# Patient Record
Sex: MALE | Race: WHITE | NOT HISPANIC OR LATINO | Employment: FULL TIME | URBAN - METROPOLITAN AREA
[De-identification: names, ages, dates, MRNs, and addresses within clinical notes are randomized per-mention and may not be internally consistent; named-entity substitution may affect disease eponyms.]

---

## 2017-10-03 ENCOUNTER — GENERIC CONVERSION - ENCOUNTER (OUTPATIENT)
Dept: OTHER | Facility: OTHER | Age: 22
End: 2017-10-03

## 2017-10-03 DIAGNOSIS — R11.2 NAUSEA WITH VOMITING: ICD-10-CM

## 2017-10-03 DIAGNOSIS — Z13.220 ENCOUNTER FOR SCREENING FOR LIPOID DISORDERS: ICD-10-CM

## 2017-10-03 DIAGNOSIS — R10.9 ABDOMINAL PAIN: ICD-10-CM

## 2017-10-14 ENCOUNTER — HOSPITAL ENCOUNTER (OUTPATIENT)
Dept: ULTRASOUND IMAGING | Facility: HOSPITAL | Age: 22
Discharge: HOME | End: 2017-10-14
Payer: COMMERCIAL

## 2017-10-14 DIAGNOSIS — R11.2 NAUSEA WITH VOMITING: ICD-10-CM

## 2017-10-14 DIAGNOSIS — R10.9 ABDOMINAL PAIN: ICD-10-CM

## 2017-10-14 PROCEDURE — 76705 ECHO EXAM OF ABDOMEN: CPT

## 2017-10-17 ENCOUNTER — GENERIC CONVERSION - ENCOUNTER (OUTPATIENT)
Dept: OTHER | Facility: OTHER | Age: 22
End: 2017-10-17

## 2017-10-19 ENCOUNTER — APPOINTMENT (OUTPATIENT)
Dept: LAB | Facility: MEDICAL CENTER | Age: 22
End: 2017-10-19
Payer: COMMERCIAL

## 2017-10-19 ENCOUNTER — TRANSCRIBE ORDERS (OUTPATIENT)
Dept: ADMINISTRATIVE | Facility: HOSPITAL | Age: 22
End: 2017-10-19

## 2017-10-19 DIAGNOSIS — Z13.220 ENCOUNTER FOR SCREENING FOR LIPOID DISORDERS: ICD-10-CM

## 2017-10-19 DIAGNOSIS — R11.2 NAUSEA WITH VOMITING: ICD-10-CM

## 2017-10-19 DIAGNOSIS — R10.9 ABDOMINAL PAIN: ICD-10-CM

## 2017-10-19 LAB
ALBUMIN SERPL BCP-MCNC: 4.1 G/DL (ref 3.5–5)
ALP SERPL-CCNC: 93 U/L (ref 46–116)
ALT SERPL W P-5'-P-CCNC: 21 U/L (ref 12–78)
ANION GAP SERPL CALCULATED.3IONS-SCNC: 7 MMOL/L (ref 4–13)
AST SERPL W P-5'-P-CCNC: 14 U/L (ref 5–45)
BACTERIA UR QL AUTO: NORMAL /HPF
BASOPHILS # BLD AUTO: 0.01 THOUSANDS/ΜL (ref 0–0.1)
BASOPHILS NFR BLD AUTO: 0 % (ref 0–1)
BILIRUB SERPL-MCNC: 0.49 MG/DL (ref 0.2–1)
BILIRUB UR QL STRIP: NEGATIVE
BUN SERPL-MCNC: 9 MG/DL (ref 5–25)
CALCIUM SERPL-MCNC: 9.4 MG/DL (ref 8.3–10.1)
CHLORIDE SERPL-SCNC: 105 MMOL/L (ref 100–108)
CHOLEST SERPL-MCNC: 101 MG/DL (ref 50–200)
CLARITY UR: ABNORMAL
CO2 SERPL-SCNC: 28 MMOL/L (ref 21–32)
COLOR UR: YELLOW
CREAT SERPL-MCNC: 0.82 MG/DL (ref 0.6–1.3)
CRP SERPL QL: 6.4 MG/L
EOSINOPHIL # BLD AUTO: 0.04 THOUSAND/ΜL (ref 0–0.61)
EOSINOPHIL NFR BLD AUTO: 1 % (ref 0–6)
ERYTHROCYTE [DISTWIDTH] IN BLOOD BY AUTOMATED COUNT: 12.5 % (ref 11.6–15.1)
ERYTHROCYTE [SEDIMENTATION RATE] IN BLOOD: 5 MM/HOUR (ref 0–10)
GFR SERPL CREATININE-BSD FRML MDRD: 124 ML/MIN/1.73SQ M
GLUCOSE P FAST SERPL-MCNC: 88 MG/DL (ref 65–99)
GLUCOSE UR STRIP-MCNC: NEGATIVE MG/DL
HCT VFR BLD AUTO: 45.2 % (ref 36.5–49.3)
HDLC SERPL-MCNC: 36 MG/DL (ref 40–60)
HGB BLD-MCNC: 15.7 G/DL (ref 12–17)
HGB UR QL STRIP.AUTO: NEGATIVE
HYALINE CASTS #/AREA URNS LPF: NORMAL /LPF
KETONES UR STRIP-MCNC: NEGATIVE MG/DL
LDLC SERPL CALC-MCNC: 54 MG/DL (ref 0–100)
LEUKOCYTE ESTERASE UR QL STRIP: NEGATIVE
LIPASE SERPL-CCNC: 120 U/L (ref 73–393)
LYMPHOCYTES # BLD AUTO: 1.67 THOUSANDS/ΜL (ref 0.6–4.47)
LYMPHOCYTES NFR BLD AUTO: 30 % (ref 14–44)
MCH RBC QN AUTO: 31 PG (ref 26.8–34.3)
MCHC RBC AUTO-ENTMCNC: 34.7 G/DL (ref 31.4–37.4)
MCV RBC AUTO: 89 FL (ref 82–98)
MONOCYTES # BLD AUTO: 0.86 THOUSAND/ΜL (ref 0.17–1.22)
MONOCYTES NFR BLD AUTO: 16 % (ref 4–12)
NEUTROPHILS # BLD AUTO: 2.91 THOUSANDS/ΜL (ref 1.85–7.62)
NEUTS SEG NFR BLD AUTO: 53 % (ref 43–75)
NITRITE UR QL STRIP: NEGATIVE
NON-SQ EPI CELLS URNS QL MICRO: NORMAL /HPF
NRBC BLD AUTO-RTO: 0 /100 WBCS
PH UR STRIP.AUTO: 8 [PH] (ref 4.5–8)
PLATELET # BLD AUTO: 202 THOUSANDS/UL (ref 149–390)
PMV BLD AUTO: 10.8 FL (ref 8.9–12.7)
POTASSIUM SERPL-SCNC: 4.3 MMOL/L (ref 3.5–5.3)
PROT SERPL-MCNC: 7.7 G/DL (ref 6.4–8.2)
PROT UR STRIP-MCNC: ABNORMAL MG/DL
RBC # BLD AUTO: 5.06 MILLION/UL (ref 3.88–5.62)
RBC #/AREA URNS AUTO: NORMAL /HPF
SODIUM SERPL-SCNC: 140 MMOL/L (ref 136–145)
SP GR UR STRIP.AUTO: 1.02 (ref 1–1.03)
TRIGL SERPL-MCNC: 57 MG/DL
TSH SERPL DL<=0.05 MIU/L-ACNC: 2.2 UIU/ML (ref 0.36–3.74)
UROBILINOGEN UR QL STRIP.AUTO: 0.2 E.U./DL
WBC # BLD AUTO: 5.53 THOUSAND/UL (ref 4.31–10.16)
WBC #/AREA URNS AUTO: NORMAL /HPF

## 2017-10-19 PROCEDURE — 85025 COMPLETE CBC W/AUTO DIFF WBC: CPT

## 2017-10-19 PROCEDURE — 83690 ASSAY OF LIPASE: CPT

## 2017-10-19 PROCEDURE — 36415 COLL VENOUS BLD VENIPUNCTURE: CPT

## 2017-10-19 PROCEDURE — 80061 LIPID PANEL: CPT

## 2017-10-19 PROCEDURE — 80053 COMPREHEN METABOLIC PANEL: CPT

## 2017-10-19 PROCEDURE — 86140 C-REACTIVE PROTEIN: CPT

## 2017-10-19 PROCEDURE — 85652 RBC SED RATE AUTOMATED: CPT

## 2017-10-19 PROCEDURE — 81001 URINALYSIS AUTO W/SCOPE: CPT

## 2017-10-19 PROCEDURE — 84443 ASSAY THYROID STIM HORMONE: CPT

## 2017-10-20 ENCOUNTER — GENERIC CONVERSION - ENCOUNTER (OUTPATIENT)
Dept: OTHER | Facility: OTHER | Age: 22
End: 2017-10-20

## 2017-11-08 ENCOUNTER — GENERIC CONVERSION - ENCOUNTER (OUTPATIENT)
Dept: OTHER | Facility: OTHER | Age: 22
End: 2017-11-08

## 2018-01-12 NOTE — RESULT NOTES
Verified Results  [address] [place] [address] [id number] 09:20AM Author Savannah Order Number: [id number]   Performing Comments: RUQ US, rule out cholelithiasis   - Patient Instructions: To schedule this appointment, please contact Central Scheduling at [phone]  Test Name Result Flag Reference   US ABDOMEN LIMITED (Report)     RIGHT UPPER QUADRANT ULTRASOUND     INDICATION: R10 9: Unspecified abdominal pain   R11 2: Nausea with vomiting, unspecified  History taken directly from the electronic ordering system  COMPARISON: None  TECHNIQUE:  Real-time ultrasound of the right upper quadrant was performed with a curvilinear transducer with both volumetric sweeps and still imaging techniques  FINDINGS:     PANCREAS: Visualized portions of the pancreas are within normal limits  AORTA AND IVC: Visualized portions are normal for patient age  LIVER:   Size: Within normal range  The liver measures 15 2 cm in the midclavicular line  Contour: Surface contour is smooth  Parenchyma: Echogenicity and echotexture are within normal limits  No evidence of suspicious mass  Limited imaging of the main portal vein shows it to be patent and hepatopetal       BILIARY:   The gallbladder is normal in caliber  No wall thickening or pericholecystic fluid  No stones or sludge identified  No sonographic Darnell's sign  No intrahepatic biliary dilatation  CBD measures 2 mm  No choledocholithiasis  KIDNEY:    Right kidney measures 11 3 x 3 7 cm  Within normal limits  ASCITES:  None         IMPRESSION:     Normal right upper quadrant abdomen sonogram        Workstation performed: [id number]     Signed by:   Salvador Flowers MD   10/17/17       Signatures   Electronically signed by : MIRACLE Calix ; Oct 17 2017  3:45PM EST                       (Author)

## 2018-01-22 VITALS
BODY MASS INDEX: 18.02 KG/M2 | DIASTOLIC BLOOD PRESSURE: 64 MMHG | WEIGHT: 136 LBS | TEMPERATURE: 98 F | SYSTOLIC BLOOD PRESSURE: 118 MMHG | HEIGHT: 73 IN

## 2018-01-22 VITALS
BODY MASS INDEX: 18.16 KG/M2 | DIASTOLIC BLOOD PRESSURE: 74 MMHG | HEIGHT: 73 IN | SYSTOLIC BLOOD PRESSURE: 116 MMHG | WEIGHT: 137 LBS

## 2018-01-22 VITALS
DIASTOLIC BLOOD PRESSURE: 64 MMHG | SYSTOLIC BLOOD PRESSURE: 110 MMHG | HEIGHT: 73 IN | WEIGHT: 137 LBS | BODY MASS INDEX: 18.16 KG/M2 | TEMPERATURE: 97 F

## 2018-10-16 ENCOUNTER — OFFICE VISIT (OUTPATIENT)
Dept: FAMILY MEDICINE CLINIC | Facility: CLINIC | Age: 23
End: 2018-10-16
Payer: COMMERCIAL

## 2018-10-16 ENCOUNTER — TELEPHONE (OUTPATIENT)
Dept: PSYCHIATRY | Facility: CLINIC | Age: 23
End: 2018-10-16

## 2018-10-16 VITALS
HEART RATE: 92 BPM | SYSTOLIC BLOOD PRESSURE: 118 MMHG | HEIGHT: 72 IN | TEMPERATURE: 98.6 F | DIASTOLIC BLOOD PRESSURE: 78 MMHG | BODY MASS INDEX: 17.64 KG/M2 | OXYGEN SATURATION: 98 % | WEIGHT: 130.2 LBS

## 2018-10-16 DIAGNOSIS — Z72.0 TOBACCO ABUSE: Primary | ICD-10-CM

## 2018-10-16 DIAGNOSIS — R10.9 ABDOMINAL PAIN OF MULTIPLE SITES: ICD-10-CM

## 2018-10-16 DIAGNOSIS — F41.9 ANXIETY: ICD-10-CM

## 2018-10-16 PROBLEM — R11.2 NAUSEA AND VOMITING: Status: ACTIVE | Noted: 2017-10-03

## 2018-10-16 PROCEDURE — 99214 OFFICE O/P EST MOD 30 MIN: CPT | Performed by: FAMILY MEDICINE

## 2018-10-16 RX ORDER — OMEPRAZOLE 20 MG/1
20 CAPSULE, DELAYED RELEASE ORAL 2 TIMES DAILY
COMMUNITY

## 2018-10-16 RX ORDER — VARENICLINE TARTRATE 25 MG
KIT ORAL
Qty: 53 TABLET | Refills: 0 | Status: SHIPPED | OUTPATIENT
Start: 2018-10-16 | End: 2021-03-16

## 2018-10-16 NOTE — TELEPHONE ENCOUNTER
Behavorial Health Outpatient Intake Questions    Referred by: DR IGLESIAS    Check with provider before scheduling    Are there any developmental disabilities? No    Does the patient have hearing impairment? No    Does the patient have ICM or CTT? No    Taking injectable psychiatric medications? NoIf yes, patient can not be seen here  Has the patient ever seen or currently see a psychiatrist? No If yes who/when? Has the patient ever seen or currently see a therapist? Yes If yes who/when? SEEN 7 YRS AGO,IN [place] ,FOR DEPRESSION    How many visits did the pt have for previous psychiatric treatment?  History    Has the patient served in the [place] [address]? No    If yes, have you had combat services? No    Was the patient activated into federal active duty as a member of the national guard or reserve? No    Minor Child    Who has custody of the child? Is there a custody agreement? If there is a custody agreement remind parent that they must bring a copy to the first appt or they will not be seen  [facility]     Presenting Problem (in patient's words) ANXIETY, HX: DEPRESSION    Substance Abuse:No concerns of substance abuse are reported  Has the patient been seen here previously, either inpatient or outpatient? No outpatient    If seen as outpatient, what provider(s) did the patient see? N/A    A member of the patient's family has been in therapy here with N/A    ACCEPTED as a patient Yes Appointment Date: 12/27/18 @ 10:00AM STANLEY UGALDE    Referred Elsewhere?  No    Primary Care Physician: Peter Keith MD    PCP telephone number: [phone]    SUB: GLORIA  INS: [address] [place] [address]  ID: [id number]     GRP: [id number]  TEL:  [phone]

## 2018-10-16 NOTE — PROGRESS NOTES
Assessment/Plan:    18-year-old male with:  Tobacco abuse, anxiety and abdominal pain  Discussed workup and treatment options with risks and benefits  Discussed healthy diet like the Mediterranean diet, exercise, healthy weight as tolerated, ample sleep and stress reduction strategies  Patient opts to begin trial of Chantix will refer to therapy and refer to GI per patient's request   Discussed supportive care return parameters and follow-up in 1 month  No problem-specific Assessment & Plan notes found for this encounter  Diagnoses and all orders for this visit:    Tobacco abuse  -     varenicline (CHANTIX FORTINO) 0 5 MG X 11 & 1 MG X 42 tablet; Take one 0 5mg tab by mouth 1x daily for 3 days, then increase to one 0 5mg tab 2x daily for 3 days, then increase to one 1mg tab 2x daily    Anxiety  -     Ambulatory referral to Psychology; Future    Abdominal pain of multiple sites  -     Ambulatory referral to Gastroenterology; Future    Other orders  -     omeprazole (PriLOSEC) 20 mg delayed release capsule; Take 20 mg by mouth 2 (two) times a day          Subjective:   Chief Complaint   Patient presents with    Nicotine Dependence     Patient would like to discuss possibly starting on Chantix  Patient [facility] in [place]   Anxiety     Patient states he feels that anxiety could be causing cramping and sharp pains in stomach   Immunizations     Patient was offered flu vaccine today but declined          Patient ID: Madison Augustine is a 20 y o  male  Patient is a 18-year-old male who presents for follow-up on several issues  He admits that he is smoking about a pack a day and is interested in quitting  No chest pain or shortness of breath  Patient does have some anxiety but no suicidal homicidal ideation  Patient is still having some reflux issues and would like to see GI  No fevers chills nausea vomiting  No unexplained weight loss or night sweats    No other complaints at this time       Nicotine Dependence   His urge triggers include company of smokers  Anxiety   Symptoms include nervous/anxious behavior  The following portions of the patient's history were reviewed and updated as appropriate: allergies, current medications, past family history, past medical history, past social history, past surgical history and problem list     Review of Systems   Constitutional: Negative  HENT: Negative  Eyes: Negative  Respiratory: Negative  Cardiovascular: Negative  Gastrointestinal: Negative  Endocrine: Negative  Genitourinary: Negative  Musculoskeletal: Negative  Allergic/Immunologic: Negative  Neurological: Negative  Hematological: Negative  Psychiatric/Behavioral: The patient is nervous/anxious  All other systems reviewed and are negative  Objective:      /78 (BP Location: Right arm, Patient Position: Sitting, Cuff Size: Adult)   Pulse 92   Temp 98 6 °F (37 °C) (Tympanic)   Ht 6' 0 25" (1 835 m)   Wt 59 1 kg (130 lb 3 2 oz)   SpO2 98%   BMI 17 54 kg/m²          Physical Exam   Constitutional: He is oriented to person, place, and time  He appears well-developed and well-nourished  HENT:   Head: Atraumatic  Right Ear: External ear normal    Left Ear: External ear normal    Eyes: Pupils are equal, round, and reactive to light  Conjunctivae and EOM are normal    Neck: Normal range of motion  Cardiovascular: Normal rate, regular rhythm and normal heart sounds  Pulmonary/Chest: Effort normal and breath sounds normal  No respiratory distress  Abdominal: Soft  He exhibits no distension  There is no tenderness  There is no rebound and no guarding  Musculoskeletal: Normal range of motion  Neurological: He is alert and oriented to person, place, and time  No cranial nerve deficit  Skin: Skin is warm and dry  Psychiatric: He has a normal mood and affect   His behavior is normal  Judgment and thought content normal

## 2018-10-18 ENCOUNTER — TELEPHONE (OUTPATIENT)
Dept: GASTROENTEROLOGY | Facility: MEDICAL CENTER | Age: 23
End: 2018-10-18

## 2018-11-05 ENCOUNTER — OFFICE VISIT (OUTPATIENT)
Dept: GASTROENTEROLOGY | Facility: MEDICAL CENTER | Age: 23
End: 2018-11-05
Payer: COMMERCIAL

## 2018-11-05 VITALS
HEIGHT: 72 IN | HEART RATE: 59 BPM | TEMPERATURE: 97.5 F | DIASTOLIC BLOOD PRESSURE: 82 MMHG | SYSTOLIC BLOOD PRESSURE: 118 MMHG | WEIGHT: 135 LBS | BODY MASS INDEX: 18.28 KG/M2

## 2018-11-05 DIAGNOSIS — R11.14 BILIOUS VOMITING WITH NAUSEA: Primary | ICD-10-CM

## 2018-11-05 DIAGNOSIS — R10.9 ABDOMINAL PAIN OF MULTIPLE SITES: ICD-10-CM

## 2018-11-05 PROCEDURE — 99244 OFF/OP CNSLTJ NEW/EST MOD 40: CPT | Performed by: INTERNAL MEDICINE

## 2018-11-05 NOTE — PATIENT INSTRUCTIONS
The patient is scheduled at [facility] on 12/21/18 with Dr Isidro Michaels  Prep was gone over with by the MA

## 2018-11-05 NOTE — PROGRESS NOTES
[facility] - Outpatient Consultation  Mark Anthony Colmenares 20 y o  male MRN: [id number]  Encounter: [id number]          ASSESSMENT AND PLAN:      1  Abdominal pain of multiple sites  2  Bilious vomiting with nausea  - plan for EGD to rule out any peptic ulcer disease or obstructive lesions  - if EGD negative, consider gastric emptying test    - Patient has anxiety being referred to psychiatry service  It is possible his GI symptoms are consistent with functional disorders  - rule out celiac disease  ESOPHAGOGASTRODUODENOSCOPY (EGD); Standing  - Case request operating room: ESOPHAGOGASTRODUODENOSCOPY (EGD)    ______________________________________________________________________    HPI:  43-year-old man presented for evaluation of nausea vomiting and abdominal pain  Patient reported he has been having the abdominal pain in the past 1 5 years  Pain usually is on both side and can travel in the abdomen  Pain can last 30 to 40 min  It can be exacerbated by nausea vomiting  Patient reported nausea vomiting usually hours after eating  It was both bilious and sometimes mixed with food  He denies weight loss  REVIEW OF SYSTEMS:    CONSTITUTIONAL: Denies any fever, chills, rigors, and weight loss  HEENT: No earache or tinnitus  Denies hearing loss or visual disturbances  CARDIOVASCULAR: No chest pain or palpitations  RESPIRATORY: Denies any cough, hemoptysis, shortness of breath or dyspnea on exertion  GASTROINTESTINAL: As noted in the History of Present Illness  GENITOURINARY: No problems with urination  Denies any hematuria or dysuria  NEUROLOGIC: No dizziness or vertigo, denies headaches  MUSCULOSKELETAL: Denies any muscle or joint pain  SKIN: Denies skin rashes or itching  ENDOCRINE: Denies excessive thirst  Denies intolerance to heat or cold  PSYCHOSOCIAL: Denies depression or anxiety  Denies any recent memory loss         Historical Information   No past medical history on file  No past surgical history on file  Social History   History   Alcohol Use No     History   Drug Use No     History   Smoking Status    Current Every Day Smoker    Packs/day: 0 75   Smokeless Tobacco    Never Used     No family history on file  Meds/Allergies       Current Outpatient Prescriptions:     omeprazole (PriLOSEC) 20 mg delayed release capsule    varenicline (CHANTIX FORTINO) 0 5 MG X 11 & 1 MG X 42 tablet    No Known Allergies        Objective     Blood pressure 118/82, pulse 59, temperature 97 5 °F (36 4 °C), temperature source Tympanic, height 6' 0 25" (1 835 m), weight 61 2 kg (135 lb)  Body mass index is 18 18 kg/m²  PHYSICAL EXAM:      General Appearance:   Alert, cooperative, no distress   HEENT:   Normocephalic, atraumatic, anicteric      Neck:  Supple, symmetrical, trachea midline   Lungs:   Clear to auscultation bilaterally; no rales, rhonchi or wheezing; respirations unlabored    Heart[de-identified]   Regular rate and rhythm; no murmur, rub, or gallop  Abdomen:   Soft, non-tender, non-distended; normal bowel sounds; no masses, no organomegaly    Genitalia:   Deferred    Rectal:   Deferred    Extremities:  No cyanosis, clubbing or edema    Pulses:  2+ and symmetric    Skin:  No jaundice, rashes, or lesions    Lymph nodes:  No palpable cervical lymphadenopathy        Lab Results:   No visits with results within 1 Day(s) from this visit     Latest known visit with results is:   Appointment on 10/19/2017   Component Date Value    WBC 10/19/2017 5 53     RBC 10/19/2017 5 06     Hemoglobin 10/19/2017 15 7     Hematocrit 10/19/2017 45 2     MCV 10/19/2017 89     MCH 10/19/2017 31 0     MCHC 10/19/2017 34 7     RDW 10/19/2017 12 5     MPV 10/19/2017 10 8     Platelets 61/29/3700 202     nRBC 10/19/2017 0     Neutrophils Relative 10/19/2017 53     Lymphocytes Relative 10/19/2017 30     Monocytes Relative 10/19/2017 16*    Eosinophils Relative 10/19/2017 1     Basophils Relative 10/19/2017 0     Neutrophils Absolute 10/19/2017 2 91     Lymphocytes Absolute 10/19/2017 1 67     Monocytes Absolute 10/19/2017 0 86     Eosinophils Absolute 10/19/2017 0 04     Basophils Absolute 10/19/2017 0 01     Sodium 10/19/2017 140     Potassium 10/19/2017 4 3     Chloride 10/19/2017 105     CO2 10/19/2017 28     ANION GAP 10/19/2017 7     BUN 10/19/2017 9     Creatinine 10/19/2017 0 82     Glucose, Fasting 10/19/2017 88     Calcium 10/19/2017 9 4     AST 10/19/2017 14     ALT 10/19/2017 21     Alkaline Phosphatase 10/19/2017 93     Total Protein 10/19/2017 7 7     Albumin 10/19/2017 4 1     Total Bilirubin 10/19/2017 0 49     eGFR 10/19/2017 124     TSH 3RD GENERATON 10/19/2017 2 200     Cholesterol 10/19/2017 101     Triglycerides 10/19/2017 57     HDL, Direct 10/19/2017 36*    LDL Calculated 10/19/2017 54     Lipase 10/19/2017 120     Color, UA 10/19/2017 Yellow     Clarity, UA 10/19/2017 Turbid     Specific Gravity, UA 10/19/2017 1 020     pH, UA 10/19/2017 8 0     Leukocytes, UA 10/19/2017 Negative     Nitrite, UA 10/19/2017 Negative     Protein, UA 10/19/2017 Trace*    Glucose, UA 10/19/2017 Negative     Ketones, UA 10/19/2017 Negative     Urobilinogen, UA 10/19/2017 0 2     Bilirubin, UA 10/19/2017 Negative     Blood, UA 10/19/2017 Negative     Sed Rate 10/19/2017 5     CRP 10/19/2017 6 4*    RBC, UA 10/19/2017 None Seen     WBC, UA 10/19/2017 None Seen     Epithelial Cells 10/19/2017 None Seen     Bacteria, UA 10/19/2017 None Seen     Hyaline Casts, UA 10/19/2017 None Seen          Radiology Results:   No results found

## 2018-11-13 ENCOUNTER — OFFICE VISIT (OUTPATIENT)
Dept: BEHAVIORAL/MENTAL HEALTH CLINIC | Facility: CLINIC | Age: 23
End: 2018-11-13
Payer: COMMERCIAL

## 2018-11-13 DIAGNOSIS — F41.9 ANXIETY DISORDER, UNSPECIFIED TYPE: Primary | ICD-10-CM

## 2018-11-13 DIAGNOSIS — F41.8 DEPRESSION WITH ANXIETY: ICD-10-CM

## 2018-11-13 DIAGNOSIS — Z72.0 TOBACCO ABUSE: ICD-10-CM

## 2018-11-13 PROCEDURE — 90791 PSYCH DIAGNOSTIC EVALUATION: CPT | Performed by: SOCIAL WORKER

## 2018-11-13 RX ORDER — VARENICLINE TARTRATE 1 MG/1
1 TABLET, FILM COATED ORAL 2 TIMES DAILY
Qty: 60 TABLET | Refills: 0 | Status: SHIPPED | OUTPATIENT
Start: 2018-11-13 | End: 2021-03-16

## 2018-11-13 NOTE — TELEPHONE ENCOUNTER
Pt left a message asking for a refill of Chantix  He stated he completed the first 28 days and wishes to continue   Please verify the medication (dose/directions/qty) and approve  :-)

## 2018-11-13 NOTE — PSYCH
Assessment/Plan:      There are no diagnoses linked to this encounter  Subjective:      Patient ID: Monse Diana is a 20 y o  male  HPI:     Pre-morbid level of function and History of Present Illness: PHQ 9 = 8; CAROL 7 = 12; Within the past 1 5 years I have had an upset stomach with nausea with episodes of vomiting (also, had occurred in middle school with medical testing reportedly yielding nothing) "social anxiety or anxiety in general; "If I do have anxiety or not and what can I do to help fight it "  had had a medication regimen for depression within the past few years; "That (depression) is something to (also) improve on "  has a "really good relationship" with his girlfriend, Annika Toscano; "We communicate with each other " states mother and father have histories of depression;    Previous Psychiatric/psychological treatment/year: ind psych at age 23 or 21 "for a few months; It helped in a way  I was not mature enough to understand what I would get out of it "     Current Psychiatrist/Therapist: WOODY Damian, MSW/YEMIW    Outpatient and/or Partial and Other Community Resources Used (CTT, ICM, VNA): Outpatient outpatient      Problem Assessment:     SOCIAL/VOCATION:  Family Constellation (include parents, relationship with each and pertinent Psych/Medical History):     No family history on file  Mother: age 46; "good;" ([place]); Spouse: n/a    Father: age  58; "geoffrey;""I didn't like my dad very much growing up  He brought my mom down  He drank a lot  He could never hold a job  He loves me, and he cares (about me)  I rarely talk to him  I rarely see him " [facility])    Children: n/a    Sibling: bro, age 21; "When we were kids, we hated each other  We would fight all the time  We became closer in high school when we graduated (from high school)  "    Sibling: n/a    Children: n/a    Other: "Brother-" Polina Karla, his best friend from school (been friends since 4th grade); moved in with Jai's family when Magdalena Bennett was age 10; Ksenia Dowd maintains contact with him monthly ([place]); Girlfriend, Hersrobson Wooten, age 22;     Ksenia Dowd relates best to Luz Wooten; Magdalena Bennett; Chuckie Meño  he lives with his girlfriend  he does not live alone  Domestic Violence: No past history of domestic violence    Additional Comments related to family/relationships/peer support: parents  6 -7 years ago;  states his girlfriend is supportive; states his three cats bring him mann; "My entire family lives in [place], [place]; sees family "at least once/year; states has, more recently, been trying to mend his relationship with his father;    School or Work History (strengths/limitations/needs): community college: "got kicked out" because reports was not attending class; less than one year of a 4 year college which he did not complete due to his report of his not attending classes; 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  Yes     FUNCTIONAL STATUS: There has been a recent change in Ksenia Dowd ability to do the following: n/a    Level of Assistance Needed/By Whom?: n/a    Ksenia Dowd learns best by  Demonstration and "doing it by myself;"      SUBSTANCE ABUSE ASSESSMENT: reports in process of quitting smoking with Chantix ("day 28 today"); states has not smoked a cigarette in 48 hours; states uses Marijuana daily;    Substance/Route/Age/Amount/Frequency/Last Use: 11/12/18    DETOX HISTORY: n/a    Previous detox/rehab treatment: n/a    HEALTH ASSESSMENT: periodic vomiting and nausea reported;    LEGAL: No Mental Health Advance Directive or Power of  on file    Prenatal History: N/A    Delivery History: N/A    Developmental Milestones: N/A  Temperament as an infant was N/A  Temperament as a toddler was N/A  Temperament at school age was N/A  Temperament as a teenager was N/A  Risk Assessment:   The following ratings are based on my observation of this patient over the last interview    Risk of Harm to Self:   Demographic risk factors include , never  or  status and SI during high school years and up until 2 -3 years ago; Historical Risk Factors include substance abuse or dependence  Recent Specific Risk Factors include chronic pain or health problems  Additional Factors for a Child or Adolescent n/a    Risk of Harm to Others:   Demographic Risk Factors include [age] years of age; male  Historical Risk Factors include victim of childhood bullying  Recent Specific Risk Factors include abusing substances    Access to Weapons:   Marialuisa Delay has access to the following weapons: n/a   The following steps have been taken to ensure weapons are properly secured: n/a    Based on the above information, the client presents the following risk of harm to self or others:  low    The following interventions are recommended:   no intervention changes    Notes regarding this Risk Assessment: n/a      Review Of Systems:     Mood Anxiety and Depression   Behavior Normal    Thought Content Unreasonalbe or Irrational Fears   General Emotional Problems and Sleep Disturbances   Personality Normal   Other Psych Symptoms Normal   Constitutional n/a   ENT n/a   Cardiovascular n/a   Respiratory n/a   Gastrointestinal n/a   Genitourinary n/a   Musculoskeletal n/a   Integumentary n/a   Neurological n/a   Endocrine n/a         Mental status:  Appearance calm and cooperative  and good eye contact    Mood depressed and anxious   Affect affect was flat   Speech a normal rate   Thought Processes normal thought processes   Hallucinations no hallucinations present    Thought Content no delusions   Abnormal Thoughts no suicidal thoughts  and no homicidal thoughts    Orientation  oriented to person and place and time   Remote Memory short term memory intact and long term memory impaired   Attention Span concentration intact   Intellect Appears to be of Average Intelligence   Fund of Knowledge n/a   Insight uncertain   Judgement judgment was intact   Muscle Strength n/a   Language n/a   Pain minimum to moderate   Pain Scale 4

## 2018-11-27 ENCOUNTER — TELEPHONE (OUTPATIENT)
Dept: GASTROENTEROLOGY | Facility: MEDICAL CENTER | Age: 23
End: 2018-11-27

## 2018-12-11 ENCOUNTER — DOCUMENTATION (OUTPATIENT)
Dept: BEHAVIORAL/MENTAL HEALTH CLINIC | Facility: CLINIC | Age: 23
End: 2018-12-11

## 2018-12-11 ENCOUNTER — OFFICE VISIT (OUTPATIENT)
Dept: BEHAVIORAL/MENTAL HEALTH CLINIC | Facility: CLINIC | Age: 23
End: 2018-12-11
Payer: COMMERCIAL

## 2018-12-11 DIAGNOSIS — F41.8 DEPRESSION WITH ANXIETY: Primary | ICD-10-CM

## 2018-12-11 PROCEDURE — 90834 PSYTX W PT 45 MINUTES: CPT | Performed by: SOCIAL WORKER

## 2018-12-11 NOTE — PSYCH
Psychotherapy Provided: Individual Psychotherapy 45 minutes PHQ 9 = 3; CAROL 7 = 8; "It has been better (during) the past couple of weeks " attributes his reported change in outlook includes his [place] shopping for his family ( resides out of state from his family); shared plans to spend time with his girlfriend's Davee Situ) family [place] El and to, also, spend the night; He noted his looking forward to him and his girlfriend spending [place] Day together  states has plans to take time off early into the New Year; reports nausea (since the most recent counseling session) and with his report of quitting smoking within the past four weeks, has had less stomach aches;  States usually experiences stomach aches/nausea following eating out at sit down restaurants ("has only happened within the past two months); reports social anxiety experiences since elementary school years;  expressed concerns about "always wanting people to like me" and being perfect; Discussed triggers to include what he reports he experiences regarding anxiety: ex , sit down dinners, large groups of people; discussed the CBT approach to learning  Including cog thought-stopping with anxiety thinking and anxiety-reducing thinking; A: presents as attentive and as committed to learning to become less anxious; P:(G#1 ) will identify anxiety thinking patterns and triggers; Length of time in session: 45 minutes, follow up in 2 week    Goals addressed in session: Goal 1     Pain:      none    0    Current suicide risk : Low         Behavioral Health Treatment Plan [facility]: Diagnosis and Treatment Plan explained to Dwain Abebe relates understanding diagnosis and is agreeable to Treatment Plan   Yes

## 2018-12-11 NOTE — PROGRESS NOTES
Kourtney Stevens  1995       Date of Initial Treatment Plan: 12/11/18  Date of Current Treatment Plan: 12/11/18    Treatment Plan Number  # 1    Strengths/Personal Resources for Self Care: personable; loyal; kind-hearted;    Diagnosis: F41 8    Area of Needs:anxiety     Long Term Goal 1: AI want to learn how to better manage the anxiety  Target Date: 4/11/19  Completion Date: n/a         Short Term Objectives for Goal 1: AI will identify/remain aware of triggers to the anxiety  B  I will identify any body cues (how my body responds when feeling anxious)  C  I will identify the anxiety thinking (neg )I experience  D  I will learn anxiety-reducing thinking  (pos/realistic)  E  I will learn if medication may be an option for me  F  I will make sure I am taking care of myself  Target Date: 4/11/19  Completion Date: n/a    Long Term Goal 2: I will continue to maintain my self-awareness  Target Date: 4/11/19  Completion Date: N/A    Short Term Objectives for Goal 2: AI will review my influences regarding how I manage money  B  I will identify my view about money  C  I will review my outlook about myself ("I feel I have a pretty sense of self-awareness  (reflecting on things  ")  D  I will continue to be aware of effective communication  Long Term Goal # 3: I want to figure out what the next steps with my future to include school (a different/successful career)   Target Date: 4/11/19  Completion Date: N/A    Short Term Objectives for Goal 3: AI will identify my values  B  I will review effective decision-making strategies  C  I will decide my first step only to explore schooling       Target Date:  4/11/19  Compeltion Date: n/a    GOAL 1: Modality: Individual 2x per month   Completion Date n/a and The person(s) responsible for carrying out the plan is  Raegan Simpson    GOAL 2: Modality: Individual 2x per month   Completion Date n/a and The person(s) responsible for carrying out the plan is   Raegan Simpson GOAL 3: Modality: Individual 2x per month   Completion Date n/a and The person(s) responsible for carrying out the plan is  [address]: Diagnosis and Treatment Plan explained to Haris Yun relates understanding diagnosis and is agreeable to Treatment Plan         Client Comments : Please share your thoughts, feelings, need and/or experiences regarding your treatment plan:       __________________________________________________________________    __________________________________________________________________    __________________________________________________________________    __________________________________________________________________    _______________________________________                Patient signature, Date Time: __________________________________________             Physician cosigner signature, Date, Time: ________________________________

## 2018-12-11 NOTE — PROGRESS NOTES
Treatment Plan Tracking    # 1Treatment Plan not completed within required time limits due to: Client did not schedule an appointment within 15 days of initial assessment  Rex Arce

## 2018-12-20 ENCOUNTER — ANESTHESIA EVENT (OUTPATIENT)
Dept: GASTROENTEROLOGY | Facility: MEDICAL CENTER | Age: 23
End: 2018-12-20
Payer: COMMERCIAL

## 2018-12-21 ENCOUNTER — ANESTHESIA (OUTPATIENT)
Dept: GASTROENTEROLOGY | Facility: MEDICAL CENTER | Age: 23
End: 2018-12-21
Payer: COMMERCIAL

## 2018-12-21 ENCOUNTER — HOSPITAL ENCOUNTER (OUTPATIENT)
Facility: MEDICAL CENTER | Age: 23
Setting detail: OUTPATIENT SURGERY
Discharge: HOME | End: 2018-12-21
Attending: INTERNAL MEDICINE | Admitting: INTERNAL MEDICINE
Payer: COMMERCIAL

## 2018-12-21 VITALS
RESPIRATION RATE: 16 BRPM | HEIGHT: 72 IN | SYSTOLIC BLOOD PRESSURE: 136 MMHG | WEIGHT: 135 LBS | BODY MASS INDEX: 18.28 KG/M2 | HEART RATE: 70 BPM | OXYGEN SATURATION: 98 % | TEMPERATURE: 98.6 F | DIASTOLIC BLOOD PRESSURE: 75 MMHG

## 2018-12-21 DIAGNOSIS — R10.9 ABDOMINAL PAIN OF MULTIPLE SITES: ICD-10-CM

## 2018-12-21 DIAGNOSIS — R11.14 BILIOUS VOMITING WITH NAUSEA: ICD-10-CM

## 2018-12-21 PROCEDURE — 88305 TISSUE EXAM BY PATHOLOGIST: CPT | Performed by: PATHOLOGY

## 2018-12-21 PROCEDURE — 43239 EGD BIOPSY SINGLE/MULTIPLE: CPT | Performed by: INTERNAL MEDICINE

## 2018-12-21 RX ORDER — LIDOCAINE HYDROCHLORIDE 20 MG/ML
INJECTION, SOLUTION EPIDURAL; INFILTRATION; INTRACAUDAL; PERINEURAL AS NEEDED
Status: DISCONTINUED | OUTPATIENT
Start: 2018-12-21 | End: 2018-12-21 | Stop reason: SURG

## 2018-12-21 RX ORDER — PROPOFOL 10 MG/ML
INJECTION, EMULSION INTRAVENOUS AS NEEDED
Status: DISCONTINUED | OUTPATIENT
Start: 2018-12-21 | End: 2018-12-21 | Stop reason: SURG

## 2018-12-21 RX ORDER — SODIUM CHLORIDE 9 MG/ML
125 INJECTION, SOLUTION INTRAVENOUS CONTINUOUS
Status: DISCONTINUED | OUTPATIENT
Start: 2018-12-21 | End: 2018-12-21 | Stop reason: HOSPADM

## 2018-12-21 RX ADMIN — PROPOFOL 120 MG: 10 INJECTION, EMULSION INTRAVENOUS at 11:39

## 2018-12-21 RX ADMIN — PROPOFOL 20 MG: 10 INJECTION, EMULSION INTRAVENOUS at 11:41

## 2018-12-21 RX ADMIN — LIDOCAINE HYDROCHLORIDE 3 ML: 20 INJECTION, SOLUTION EPIDURAL; INFILTRATION; INTRACAUDAL; PERINEURAL at 11:39

## 2018-12-21 RX ADMIN — SODIUM CHLORIDE: 0.9 INJECTION, SOLUTION INTRAVENOUS at 11:45

## 2018-12-21 RX ADMIN — SODIUM CHLORIDE 125 ML/HR: 0.9 INJECTION, SOLUTION INTRAVENOUS at 10:39

## 2018-12-21 RX ADMIN — PROPOFOL 20 MG: 10 INJECTION, EMULSION INTRAVENOUS at 11:42

## 2018-12-21 RX ADMIN — PROPOFOL 20 MG: 10 INJECTION, EMULSION INTRAVENOUS at 11:40

## 2018-12-21 RX ADMIN — PROPOFOL 20 MG: 10 INJECTION, EMULSION INTRAVENOUS at 11:43

## 2018-12-21 NOTE — H&P
History and Physical - [facility]  Holly Richardson 20 y o  male MRN: [id number]                  HPI: Holly Richardson is a 20y o  year old male who presents for nausea and vomiting      REVIEW OF SYSTEMS: Per the HPI, and otherwise unremarkable  Historical Information   History reviewed  No pertinent past medical history  Past Surgical History:   Procedure Laterality Date    ESOPHAGOGASTRODUODENOSCOPY      MOLE REMOVAL       Social History   History   Alcohol Use    Yes     History   Drug Use No     History   Smoking Status    Former Smoker    Packs/day: 0 75    Quit date: 11/1/2018   Smokeless Tobacco    Never Used     History reviewed  No pertinent family history  Meds/Allergies     Prescriptions Prior to Admission   Medication    omeprazole (PriLOSEC) 20 mg delayed release capsule    varenicline (CHANTIX) 1 mg tablet    varenicline (CHANTIX FORTINO) 0 5 MG X 11 & 1 MG X 42 tablet       No Known Allergies    Objective     Blood pressure 121/85, pulse 65, temperature 98 6 °F (37 °C), temperature source Temporal, resp  rate 18, height 6' (1 829 m), weight 61 2 kg (135 lb), SpO2 98 %  PHYSICAL EXAM    Gen: NAD  CV: RRR  CHEST: Clear  ABD: soft, NT/ND  EXT: no edema      ASSESSMENT/PLAN:  This is a 20y o  year old male here for nausea and vomiting, and he is stable and optimized for his procedure

## 2018-12-21 NOTE — DISCHARGE INSTRUCTIONS
Upper Endoscopy   WHAT YOU NEED TO KNOW:   An upper endoscopy is also called an upper gastrointestinal (GI) endoscopy, or an esophagogastroduodenoscopy (EGD)  You may feel bloated, gassy, or have some abdominal discomfort after your procedure  Your throat may be sore for 24 to 36 hours  You may burp or pass gas from air that is still inside your body  DISCHARGE INSTRUCTIONS:   Call 911 if:   · You have sudden chest pain or trouble breathing  Seek care immediately if:   · You feel dizzy or faint  · You have trouble swallowing  · You have severe throat pain  · Your bowel movements are very dark or black  · Your abdomen is hard and firm and you have severe pain  · You vomit blood  Contact your healthcare provider if:   · You feel full or bloated and cannot burp or pass gas  · You have not had a bowel movement for 3 days after your procedure  · You have neck pain  · You have a fever or chills  · You have nausea or are vomiting  · You have a rash or hives  · You have questions or concerns about your endoscopy  Relieve a sore throat:  Suck on throat lozenges or crushed ice  Gargle with a small amount of warm salt water  Mix 1 teaspoon of salt and 1 cup of warm water to make salt water  Relieve gas and discomfort from bloating:  Lie on your right side with a heating pad on your abdomen  Take short walks to help pass gas  Eat small meals until bloating is relieved  Rest after your procedure:  Do not drive or make important decisions until the day after your procedure  Return to your normal activity as directed  You can usually return to work the day after your procedure  Follow up with your healthcare provider as directed:  Write down your questions so you remember to ask them during your visits  © 2017 [employer]  Information is for End User's use only and may not be sold, redistributed or otherwise used for commercial purposes   All illustrations and images included in [employer] are the copyrighted property of [employer] , [employer]  or Josh He  The above information is an  only  It is not intended as medical advice for individual conditions or treatments  Talk to your doctor, nurse or pharmacist before following any medical regimen to see if it is safe and effective for you

## 2018-12-21 NOTE — ANESTHESIA PREPROCEDURE EVALUATION
Review of Systems/Medical History  Patient summary reviewed  Chart reviewed      Cardiovascular   Pulmonary  Smoker ex-smoker  ,        GI/Hepatic  Negative GI/hepatic ROS     Comment: Bilious vomiting     Negative  ROS        Endo/Other  Negative endo/other ROS      GYN       Hematology  Negative hematology ROS      Musculoskeletal  Negative musculoskeletal ROS        Neurology  Negative neurology ROS      Psychology           Physical Exam    Airway    Mallampati score: I  TM Distance: <3 FB  Neck ROM: full     Dental   No notable dental hx     Cardiovascular  Rhythm: regular, Rate: normal, Cardiovascular exam normal    Pulmonary  Pulmonary exam normal     Other Findings        Anesthesia Plan  ASA Score- 2     Anesthesia Type- IV sedation with anesthesia with ASA Monitors  Additional Monitors:   Airway Plan:         Plan Factors-  Patient did not smoke on day of surgery  Induction- intravenous  Postoperative Plan-     Informed Consent- Anesthetic plan and risks discussed with patient

## 2018-12-21 NOTE — OP NOTE
ESOPHAGOGASTRODUODENOSCOPY    PROCEDURE: EGD/ Biopsy    INDICATIONS: Nausea and Vomiting    POST-OP DIAGNOSIS: See the impression below    SEDATION: Monitored anesthesia care, check anesthesia records    PHYSICAL EXAM:    Vitals:    12/21/18 1033   BP: 121/85   Pulse: 65   Resp: 18   Temp: 98 6 °F (37 °C)   SpO2: 98%    Body mass index is 18 31 kg/m²  General: NAD  Heart: S1 & S2 normal, RRR  Lungs: CTA, No rales or rhonchi  Abdomen: Soft, nontender, nondistended, good bowel sounds    CONSENT:  Informed consent was obtained for the procedure, including sedation after explaining the risks and benefits of the procedure  Risks including but not limited to bleeding, perforation, infection, aspiration were discussed in detail  Also explained about less than 100% sensitivity with the exam and other alternatives  PREPARATION:   EKG tracing, pulse oximetry, blood pressure were monitored throughout the procedure  Patient was identified by myself both verbally and by visual inspection of ID band  DESCRIPTION:   Patient was placed in the left lateral decubitus position and was sedated with the above medication  The gastroscope was introduced in to the oropharynx and the esophagus was intubated under direct visualization  Scope was passed down the esophagus up to 2nd part of the duodenum  A careful inspection was made as the gastroscope was withdrawn, including a retroflexed view of the stomach; findings and interventions are described below  FINDINGS:    #1  Esophagus and GEJ- Z-line at 43 cm  No esophagitis  No signs of grimes's esophagus    #2  Stomach- normal   Random cold forceps biopsies taken  #3  Duodenum- normal   Cold forceps biopsy was taken  IMPRESSIONS:      Normal EGD    RECOMMENDATIONS:     Follow up biopsy result  Follow up in office  COMPLICATIONS:  None; patient tolerated the procedure well            DISPOSITION: PACU           CONDITION: Stable

## 2019-01-08 ENCOUNTER — OFFICE VISIT (OUTPATIENT)
Dept: BEHAVIORAL/MENTAL HEALTH CLINIC | Facility: CLINIC | Age: 24
End: 2019-01-08
Payer: COMMERCIAL

## 2019-01-08 DIAGNOSIS — F41.8 DEPRESSION WITH ANXIETY: Primary | ICD-10-CM

## 2019-01-08 PROCEDURE — 90847 FAMILY PSYTX W/PT 50 MIN: CPT | Performed by: SOCIAL WORKER

## 2019-01-08 NOTE — PSYCH
Psychotherapy Provided: Individual Psychotherapy 45 minutes Lizabeth Engle, his girlfriend, attended the session today with Cally Lezama told Sung Orr (per request of this writer, his psychotherapist) what is most helpful to him with his anxiety experiences is "letting me talk about it and as having an open mind to it " Discussed realistic expectations, effective communication strategies and how resistance is a "normal" part of change (learning); discussed/reviewed anxiety regarding the importance of recognizing triggers and physiological responses during the anxiety response; A: Both had good eye contact and were attentive during the session (to the psychotherapist and to each other)  Sung Orr asked questions about anxiety and presented as interested in being effectively supportive to Dickson Oneal Dr: (G# 1) will have Teja identify triggers and physiological symptoms; Length of time in session: 45 minutes, follow up in 2 week    Goals addressed in session: Goal 1     Pain:      none    0    Current suicide risk : Low         Behavioral Health Treatment Plan [facility]: Diagnosis and Treatment Plan explained to Winnie Bardales relates understanding diagnosis and is agreeable to Treatment Plan   Yes

## 2019-02-06 ENCOUNTER — OFFICE VISIT (OUTPATIENT)
Dept: BEHAVIORAL/MENTAL HEALTH CLINIC | Facility: CLINIC | Age: 24
End: 2019-02-06
Payer: COMMERCIAL

## 2019-02-06 DIAGNOSIS — F41.8 DEPRESSION WITH ANXIETY: Primary | ICD-10-CM

## 2019-02-06 PROCEDURE — 90834 PSYTX W PT 45 MINUTES: CPT | Performed by: SOCIAL WORKER

## 2019-02-06 NOTE — PSYCH
Psychotherapy Provided: Individual Psychotherapy 45 minutes PHQ 9 = 3; CAROL 7 = 9; "I feel like my anxiety has been lower more recently  My issues with my stomach ("my stomach pain") has defintiely decreased  " states has been eating out once/week and reports has not had subsequent stomach discomfort "since the New Year;" states had quit smoking "in November and December;"  Describes the depression as "very up and down" and the anxiety as "not always there but I am better at managing the anxiety than I am at managing the depression;" states is challenging the anxiety "get my mind to think about other things;"  He stated life style change triggers the anxiety  "I don't know what triggers the depression " described himself as intermittently "stand offish" (including with Griselda Gama, his girlfriend);  initially diagnosed with depression during his high school years at which time had been prescribed an antidepressant (Wellburtin as being the most effective);  had taken the Wellbutrin until age 23 and resumed at age 21 or 19 for another 1 5 years and had stopped and not had a 'script since then; "I don't like taking them " states the depression is "less severe" than during high school (reports his Mom has depression issues and father, as well (the latter not diagnosed); He noted his being happy for his girlfriend attending college and as "having a passion" for her career goals  He noted while he has a full time job, 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  discussed/reviewed the ongoing importance of considering exploring options for schooling (ex , consider contacting [facility] Admissions for career inventories); A: presents as eager to explore schooling to develop his own career; The anxiety level has lessened and has concerns about the "up and down"nature of the depression yet reluctant to consider taking medication at this time    P: (G#1 ) will contact [facility] regarding career inventory "testing;" will begin discussing the cog thought-stopping process of depression management;      Length of time in session: 45 minutes, follow up in 2 week    Goals addressed in session: Goal 1     Pain:      none    0    Current suicide risk : Low         Behavioral Health Treatment Plan [facility]: Diagnosis and Treatment Plan explained to Cornelio Raymond relates understanding diagnosis and is agreeable to Treatment Plan   Yes

## 2019-03-06 ENCOUNTER — OFFICE VISIT (OUTPATIENT)
Dept: BEHAVIORAL/MENTAL HEALTH CLINIC | Facility: CLINIC | Age: 24
End: 2019-03-06
Payer: COMMERCIAL

## 2019-03-06 DIAGNOSIS — F41.8 DEPRESSION WITH ANXIETY: Primary | ICD-10-CM

## 2019-03-06 PROCEDURE — 90834 PSYTX W PT 45 MINUTES: CPT | Performed by: SOCIAL WORKER

## 2019-03-06 NOTE — PSYCH
Psychotherapy Provided: Individual Psychotherapy 45 minutes PHq 9 = 0; CAROL 7 = 3;  feels is beginning to use what he is learning about anxiety management;  has a new manager ([address] [place] [address] with DM experience) with the first day being 3/5/19 as having met her; He noted in the meantime had worried about  "What is she going to be like?" "I distracted my mind with something else (ex , turned on the TV or watched a movie)  "  He described, "She is more personable  She knows what she is doing "   "A lot of my anxiety in talking with her went away " reports his communication with his girlfriend, Aung Nair, is "good;" When discussing "cues" to anxiety management: sharp pains in his stomach  He notes a recent  reduction in the frequency of occurrence of stomach aches: "once every two weeks; the most infrequent ever;" states it "picked up again" 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; When asked about his approach to what is expected of him, he noted avoidance/procrastination, if uncertain about it  He stated,  "Most of my stress is fear of failure (at whatever he is contemplating doing)  "It is only when it is the day before the dealing or if I feel I can't deal with it any more "  He noted his more prominent area of procrastination is detemerining whether he wants to go to school  "I (now) have have (job) stability " discussed other areas to explore the possibility of his enrolling in college (to include networking strategies discussed; A: He was candid about his two short-lived college experiences both of which ended in his dropping out of college  He remains open to considering options to explore as opposed to arriving at premature decisions/conclusions regarding college  P: (G# ) will continue with follow up on his exploring the possibility of taking college courses;       Length of time in session: 45 minutes, follow up in 2 week    Goals addressed in session: Goal 1     Pain: none    0    Current suicide risk : Low         Behavioral Health Treatment Plan [facility]: Diagnosis and Treatment Plan explained to Jasmine Richmond relates understanding diagnosis and is agreeable to Treatment Plan   Yes

## 2019-04-01 ENCOUNTER — DOCUMENTATION (OUTPATIENT)
Dept: BEHAVIORAL/MENTAL HEALTH CLINIC | Facility: CLINIC | Age: 24
End: 2019-04-01

## 2019-04-01 ENCOUNTER — OFFICE VISIT (OUTPATIENT)
Dept: BEHAVIORAL/MENTAL HEALTH CLINIC | Facility: CLINIC | Age: 24
End: 2019-04-01
Payer: COMMERCIAL

## 2019-04-01 DIAGNOSIS — F41.8 DEPRESSION WITH ANXIETY: ICD-10-CM

## 2019-04-01 PROCEDURE — 90834 PSYTX W PT 45 MINUTES: CPT | Performed by: SOCIAL WORKER

## 2019-05-06 ENCOUNTER — OFFICE VISIT (OUTPATIENT)
Dept: BEHAVIORAL/MENTAL HEALTH CLINIC | Facility: CLINIC | Age: 24
End: 2019-05-06
Payer: COMMERCIAL

## 2019-05-06 DIAGNOSIS — F41.8 DEPRESSION WITH ANXIETY: Primary | ICD-10-CM

## 2019-05-06 PROCEDURE — 90834 PSYTX W PT 45 MINUTES: CPT | Performed by: SOCIAL WORKER

## 2019-06-03 ENCOUNTER — OFFICE VISIT (OUTPATIENT)
Dept: BEHAVIORAL/MENTAL HEALTH CLINIC | Facility: CLINIC | Age: 24
End: 2019-06-03
Payer: COMMERCIAL

## 2019-06-03 DIAGNOSIS — F41.8 DEPRESSION WITH ANXIETY: Primary | ICD-10-CM

## 2019-06-03 PROCEDURE — 90834 PSYTX W PT 45 MINUTES: CPT | Performed by: SOCIAL WORKER

## 2019-09-03 ENCOUNTER — TELEPHONE (OUTPATIENT)
Dept: BEHAVIORAL/MENTAL HEALTH CLINIC | Facility: CLINIC | Age: 24
End: 2019-09-03

## 2019-09-03 ENCOUNTER — TELEPHONE (OUTPATIENT)
Dept: PSYCHOLOGY | Facility: CLINIC | Age: 24
End: 2019-09-03

## 2021-02-11 ENCOUNTER — OFFICE VISIT (OUTPATIENT)
Dept: URGENT CARE | Age: 26
End: 2021-02-11
Payer: COMMERCIAL

## 2021-02-11 VITALS
DIASTOLIC BLOOD PRESSURE: 80 MMHG | HEIGHT: 73 IN | BODY MASS INDEX: 23.19 KG/M2 | OXYGEN SATURATION: 97 % | WEIGHT: 175 LBS | TEMPERATURE: 98.4 F | HEART RATE: 84 BPM | SYSTOLIC BLOOD PRESSURE: 134 MMHG

## 2021-02-11 DIAGNOSIS — R11.2 INTRACTABLE VOMITING WITH NAUSEA, UNSPECIFIED VOMITING TYPE: Primary | ICD-10-CM

## 2021-02-11 DIAGNOSIS — R10.84 GENERALIZED ABDOMINAL PAIN: ICD-10-CM

## 2021-02-11 PROCEDURE — 99213 OFFICE O/P EST LOW 20 MIN: CPT | Performed by: NURSE PRACTITIONER

## 2021-02-11 RX ORDER — ONDANSETRON 4 MG/1
4 TABLET, ORALLY DISINTEGRATING ORAL ONCE
Status: COMPLETED | OUTPATIENT
Start: 2021-02-11 | End: 2021-02-11

## 2021-02-11 RX ADMIN — ONDANSETRON 4 MG: 4 TABLET, ORALLY DISINTEGRATING ORAL at 19:15

## 2021-02-12 NOTE — PROGRESS NOTES
NAME: Abdoulaye Pugh is a 30 y o  male  : 1995    MRN: [id number]    /80   Pulse 84   Temp 98 4 °F (36 9 °C)   Ht 6' 1" (1 854 m)   Wt 79 4 kg (175 lb)   SpO2 97%   BMI 23 09 kg/m²     Assessment and Plan   Intractable vomiting with nausea, unspecified vomiting type [R11 2]  1  Intractable vomiting with nausea, unspecified vomiting type  ondansetron (ZOFRAN-ODT) dispersible tablet 4 mg    Transfer to other facility   2  Generalized abdominal pain  Transfer to other facility     Administrations This Visit     ondansetron (ZOFRAN-ODT) dispersible tablet 4 mg     Admin Date  2021 Action  Given Dose  4 mg Route  Oral Administered By  EMIR Whitaker Slider was seen today for vomiting and covid-19  Diagnoses and all orders for this visit:    Intractable vomiting with nausea, unspecified vomiting type  -     ondansetron (ZOFRAN-ODT) dispersible tablet 4 mg  -     Transfer to other facility    Generalized abdominal pain  -     Transfer to other facility    Other orders  -     Cancel: Novel Coronavirus (Covid-19),PCR [facility]        Patient Instructions   There are no Patient Instructions on file for this visit  Proceed to the nearest ER if symptoms worsen, Follow up with your PCP  Continue to social distance, wash your hands, and wear your masks  Please continue to follow the CDC  gov guidelines daily for they are subject to change on COVID-19    Chief Complaint     Chief Complaint   Patient presents with    Vomiting     pt states he has been vomiting since 11 am this morning  pt states he has some loose stools on and off throughout the day  pt states he has the chills unknown fever   COVID-19         History of Present Illness     31 yo male here today with his wife for vomiting, he has been since 11AM this morning and having some loose stools on and off all day  He has no fever but has the chills  Pt feels horrible and has abdominal pain   On arrival to the office, pt went to the waiting room bathroom and couldn't stop vomiting  He was given 4mg of zofran ODT to help  VSS and pt was sent  To the ER for further evaluation  He was taken to the car in wheelchair, with his wife and 3 emesis bags for the car ride  Review of Systems   Review of Systems   Constitutional: Positive for appetite change (decreased) and chills  Negative for fever  HENT: Negative  Eyes: Negative  Respiratory: Negative for shortness of breath  Gastrointestinal: Positive for abdominal pain, diarrhea, nausea and vomiting  Musculoskeletal: Negative  Skin: Positive for pallor  Neurological: Negative for dizziness, weakness and headaches  Current Medications       Current Outpatient Medications:     omeprazole (PriLOSEC) 20 mg delayed release capsule, Take 20 mg by mouth 2 (two) times a day, Disp: , Rfl:     varenicline (CHANTIX FORTINO) 0 5 MG X 11 & 1 MG X 42 tablet, Take one 0 5mg tab by mouth 1x daily for 3 days, then increase to one 0 5mg tab 2x daily for 3 days, then increase to one 1mg tab 2x daily (Patient not taking: Reported on 2/11/2021), Disp: 53 tablet, Rfl: 0    varenicline (CHANTIX) 1 mg tablet, Take 1 tablet (1 mg total) by mouth 2 (two) times a day (Patient not taking: Reported on 2/11/2021), Disp: 60 tablet, Rfl: 0  No current facility-administered medications for this visit  Current Allergies     Allergies as of 02/11/2021    (No Known Allergies)              No past medical history on file  Past Surgical History:   Procedure Laterality Date    ESOPHAGOGASTRODUODENOSCOPY      MOLE REMOVAL      [place] ESOPHAGOGASTRODUODENOSCOPY TRANSORAL DIAGNOSTIC N/A 12/21/2018    Procedure: ESOPHAGOGASTRODUODENOSCOPY (EGD); Surgeon: Becky Perdomo MD;  Location: [facility]; Service: Gastroenterology       No family history on file  Medications have been verified      The following portions of the patient's history were reviewed and updated as appropriate: allergies, current medications, past family history, past medical history, past social history, past surgical history and problem list     Objective   /80   Pulse 84   Temp 98 4 °F (36 9 °C)   Ht 6' 1" (1 854 m)   Wt 79 4 kg (175 lb)   SpO2 97%   BMI 23 09 kg/m²      Physical Exam     Physical Exam  Constitutional:       Appearance: Normal appearance  Cardiovascular:      Rate and Rhythm: Normal rate and regular rhythm  Pulmonary:      Effort: Pulmonary effort is normal       Breath sounds: Normal breath sounds and air entry  No decreased breath sounds  Abdominal:      General: Bowel sounds are normal       Palpations: Abdomen is soft  Tenderness: There is generalized abdominal tenderness and tenderness in the epigastric area  There is no right CVA tenderness or left CVA tenderness  Hernia: No hernia is present  Skin:     General: Skin is warm  Capillary Refill: Capillary refill takes less than 2 seconds  Neurological:      Mental Status: He is alert and oriented to person, place, and time  Note: Portions of this record may have been created with voice recognition software  Occasional wrong word or "sound a like" substitutions may have occurred due to the inherent limitations of voice recognition software  Please read the chart carefully and recognize, using context, where substitutions have occurred  MELODY Oliver

## 2021-03-03 ENCOUNTER — TELEMEDICINE (OUTPATIENT)
Dept: GASTROENTEROLOGY | Facility: CLINIC | Age: 26
End: 2021-03-03
Payer: COMMERCIAL

## 2021-03-03 DIAGNOSIS — R19.7 DIARRHEA, UNSPECIFIED TYPE: ICD-10-CM

## 2021-03-03 DIAGNOSIS — R93.3 ABNORMAL CT SCAN, COLON: ICD-10-CM

## 2021-03-03 DIAGNOSIS — R11.2 INTRACTABLE VOMITING WITH NAUSEA, UNSPECIFIED VOMITING TYPE: Primary | ICD-10-CM

## 2021-03-03 DIAGNOSIS — R10.84 GENERALIZED ABDOMINAL PAIN: ICD-10-CM

## 2021-03-03 DIAGNOSIS — R93.3 ABNORMAL CT SCAN, COLON: Primary | ICD-10-CM

## 2021-03-03 PROCEDURE — 99214 OFFICE O/P EST MOD 30 MIN: CPT | Performed by: PHYSICIAN ASSISTANT

## 2021-03-03 RX ORDER — SODIUM, POTASSIUM,MAG SULFATES 17.5-3.13G
SOLUTION, RECONSTITUTED, ORAL ORAL
Qty: 2 BOTTLE | Refills: 0 | Status: SHIPPED | OUTPATIENT
Start: 2021-03-03

## 2021-03-03 NOTE — H&P (VIEW-ONLY)
Virtual Regular Visit      Assessment/Plan:    1  Intractable vomiting with nausea, unspecified vomiting type  2  Diarrhea, unspecified type  3  Generalized abdominal pain  4  Abnormal CT scan, colon  He reports chronic nausea, vomiting, abdominal pain, and diarrhea for 5-6 years which intermittently flares  He was seen in the ED at [facility] THE [facility] in Aug 2020 and Feb 2021 and diagnosed with colitis both times on CT scan  Blood work showed leukocytosis 13 5, hemoglobin 15 9, creatinine 1 36, mildly elevated alk phos 158  He was treated with IV fluids, antiemetics, and discharged on Cipro and Flagyl  I explained differential diagnosis includes infectious versus inflammatory colitis such as Crohn's disease or ulcerative colitis  He could have superimposed irritable bowel syndrome as well  At this time, I recommend repeating CBC, CMP, checking inflammatory markers and infectious stool studies and scheduling colonoscopy  I do not feel repeating EGD is necessary  We discussed risks and benefits of colonoscopy  Risks include but are not limited to infection, bleeding, perforation, missed lesions  We will give instructions for Suprep  He expressed understanding and all questions were answered  As we work up his symptoms, he can continue Zofran as needed  Follow-up after colonoscopy      Problem List Items Addressed This Visit        Digestive    Nausea and vomiting - Primary      Other Visit Diagnoses     Diarrhea, unspecified type        Generalized abdominal pain        Abnormal CT scan, colon                   Reason for visit is f/up N/V/D     Encounter provider Malissa Bains PA-C    Provider located at [address] [place] [address] [address] [facility] 76  [phone]      Recent Visits  No visits were found meeting these conditions     Showing recent visits within past 7 days and meeting all other requirements     Future Appointments  No visits were found meeting these conditions  Showing future appointments within next 150 days and meeting all other requirements        The patient was identified by name and date of birth  Canelo Harris was informed that this is a telemedicine visit and that the visit is being conducted through [employer] and patient was informed that this is not a secure, HIPAA-compliant platform  He agrees to proceed     My office door was closed  No one else was in the room  He acknowledged consent and understanding of privacy and security of the video platform  The patient has agreed to participate and understands they can discontinue the visit at any time  Patient is aware this is a billable service  Subjective  Canelo Harris is a 30 y o  male  with anxiety and IBS presenting for evaluation of nausea, vomiting, diarrhea  He was seen by Dr Zacarias Suresh in 2018 for nausea, vomiting, and abdominal pain and underwent EGD which showed normal esophagus, stomach, and duodenum  Biopsies from the stomach negative for H pylori  Biopsies from the duodenum negative for celiac disease  He had abdominal ultrasound which was normal as well  He reports ongoing symptoms for the past 5-6 years  He has been today ED twice in the past 6 months with vomiting, abdominal pain, and diarrhea  He had 2 CT scans in Aug 2020 and Feb 2021 done at [facility] AT THE [facility] which showed colitis (the 1st scan showed pan colitis, and the 2nd showed transverse colitis)  He gets severe symptoms every few months with intractable vomiting, stomach cramps, and diarrhea every 30 minutes  The diarrhea varies between loose and watery in consistency  He does see occasional bright red blood on the toilet paper with wiping  He does notice that stress worsens his symptoms  He has more mild  Intermittent symptoms in between these episodes  He states that red sauce, dairy, and caffeine seem to trigger his symptoms  No past medical history on file      Past Surgical History:   Procedure Laterality Date    ESOPHAGOGASTRODUODENOSCOPY      MOLE REMOVAL      OR ESOPHAGOGASTRODUODENOSCOPY TRANSORAL DIAGNOSTIC N/A 12/21/2018    Procedure: ESOPHAGOGASTRODUODENOSCOPY (EGD); Surgeon: Tia Leon MD;  Location: [facility] GI LAB; Service: Gastroenterology       Current Outpatient Medications   Medication Sig Dispense Refill    omeprazole (PriLOSEC) 20 mg delayed release capsule Take 20 mg by mouth 2 (two) times a day      varenicline (CHANTIX FORTINO) 0 5 MG X 11 & 1 MG X 42 tablet Take one 0 5mg tab by mouth 1x daily for 3 days, then increase to one 0 5mg tab 2x daily for 3 days, then increase to one 1mg tab 2x daily (Patient not taking: Reported on 3/3/2021) 53 tablet 0    varenicline (CHANTIX) 1 mg tablet Take 1 tablet (1 mg total) by mouth 2 (two) times a day (Patient not taking: Reported on 3/3/2021) 60 tablet 0     No current facility-administered medications for this visit  No Known Allergies    REVIEW OF SYSTEMS:    CONSTITUTIONAL: Denies any fever, chills, rigors, and weight loss  HEENT: No earache or tinnitus  Denies hearing loss or visual disturbances  CARDIOVASCULAR: No chest pain or palpitations  RESPIRATORY: Denies any cough, hemoptysis, shortness of breath or dyspnea on exertion  GASTROINTESTINAL: As noted in the History of Present Illness  GENITOURINARY: No problems with urination  Denies any hematuria or dysuria  NEUROLOGIC: No dizziness or vertigo, denies headaches  MUSCULOSKELETAL: Denies any muscle or joint pain  SKIN: Denies skin rashes or itching  ENDOCRINE: Denies excessive thirst  Denies intolerance to heat or cold  PSYCHOSOCIAL: Denies depression or anxiety  Denies any recent memory loss  VIDEO EXAMINATION:  Appearance and vitals taken from home devices    General Appearance:   Alert, cooperative, no distress   HEENT:  Normocephalic, atraumatic, anicteric  Neck supple, symmetrical, trachea midline     Lungs:   Equal chest rise and unlabored breathing, normal effort, no coughing  Cardiovascular:   No visualized JVD  Abdomen:   No abdominal distension  Skin:   No jaundice, rashes, or lesions  Musculoskeletal:   Normal range of motion visualized  Psych:  Normal affect and normal insight  Neuro:  Alert and appropriate  There were no vitals filed for this visit  I spent 20 minutes directly with the patient during this visit      VIRTUAL VISIT DISCLAIMER    Sherry Dean acknowledges that he has consented to an online visit or consultation  He understands that the online visit is based solely on information provided by him, and that, in the absence of a face-to-face physical evaluation by the physician, the diagnosis he receives is both limited and provisional in terms of accuracy and completeness  This is not intended to replace a full medical face-to-face evaluation by the physician  Sherry Dean understands and accepts these terms

## 2021-03-03 NOTE — PATIENT INSTRUCTIONS
Pt asked for first avail for colonoscopy with any dr     Colonoscopy scheduled 03/16/21 @ with Dr Buck Busby instructions given verbally/mailed/emailed  Patient will call to schedule f/u for 2 months  Did not want to schedule at this time

## 2021-03-03 NOTE — PROGRESS NOTES
Virtual Regular Visit      Assessment/Plan:    1  Intractable vomiting with nausea, unspecified vomiting type  2  Diarrhea, unspecified type  3  Generalized abdominal pain  4  Abnormal CT scan, colon  He reports chronic nausea, vomiting, abdominal pain, and diarrhea for 5-6 years which intermittently flares  He was seen in the ED at [facility] in Aug 2020 and Feb 2021 and diagnosed with colitis both times on CT scan  Blood work showed leukocytosis 13 5, hemoglobin 15 9, creatinine 1 36, mildly elevated alk phos 158  He was treated with IV fluids, antiemetics, and discharged on Cipro and Flagyl  I explained differential diagnosis includes infectious versus inflammatory colitis such as Crohn's disease or ulcerative colitis  He could have superimposed irritable bowel syndrome as well  At this time, I recommend repeating CBC, CMP, checking inflammatory markers and infectious stool studies and scheduling colonoscopy  I do not feel repeating EGD is necessary  We discussed risks and benefits of colonoscopy  Risks include but are not limited to infection, bleeding, perforation, missed lesions  We will give instructions for Suprep  He expressed understanding and all questions were answered  As we work up his symptoms, he can continue Zofran as needed  Follow-up after colonoscopy      Problem List Items Addressed This Visit        Digestive    Nausea and vomiting - Primary      Other Visit Diagnoses     Diarrhea, unspecified type        Generalized abdominal pain        Abnormal CT scan, colon                   Reason for visit is f/up N/V/D     Encounter provider Jonathan Forrest PA-C    Provider located at [address] [place] [address] [address] [facility] 76  [phone]      Recent Visits  No visits were found meeting these conditions     Showing recent visits within past 7 days and meeting all other requirements     Future Appointments  No visits were found meeting these conditions  Showing future appointments within next 150 days and meeting all other requirements        The patient was identified by name and date of birth  Cm Miller was informed that this is a telemedicine visit and that the visit is being conducted through [employer] and patient was informed that this is not a secure, HIPAA-compliant platform  He agrees to proceed     My office door was closed  No one else was in the room  He acknowledged consent and understanding of privacy and security of the video platform  The patient has agreed to participate and understands they can discontinue the visit at any time  Patient is aware this is a billable service  Subjective  Cm Miller is a 30 y o  male  with anxiety and IBS presenting for evaluation of nausea, vomiting, diarrhea  He was seen by Dr Aubrey Collado in 2018 for nausea, vomiting, and abdominal pain and underwent EGD which showed normal esophagus, stomach, and duodenum  Biopsies from the stomach negative for H pylori  Biopsies from the duodenum negative for celiac disease  He had abdominal ultrasound which was normal as well  He reports ongoing symptoms for the past 5-6 years  He has been today ED twice in the past 6 months with vomiting, abdominal pain, and diarrhea  He had 2 CT scans in Aug 2020 and Feb 2021 done at [facility] AT THE [facility] which showed colitis (the 1st scan showed pan colitis, and the 2nd showed transverse colitis)  He gets severe symptoms every few months with intractable vomiting, stomach cramps, and diarrhea every 30 minutes  The diarrhea varies between loose and watery in consistency  He does see occasional bright red blood on the toilet paper with wiping  He does notice that stress worsens his symptoms  He has more mild  Intermittent symptoms in between these episodes  He states that red sauce, dairy, and caffeine seem to trigger his symptoms  No past medical history on file      Past Surgical History:   Procedure Laterality Date    ESOPHAGOGASTRODUODENOSCOPY      MOLE REMOVAL      HI ESOPHAGOGASTRODUODENOSCOPY TRANSORAL DIAGNOSTIC N/A 12/21/2018    Procedure: ESOPHAGOGASTRODUODENOSCOPY (EGD); Surgeon: Evelyn Mejia MD;  Location: [facility] GI LAB; Service: Gastroenterology       Current Outpatient Medications   Medication Sig Dispense Refill    omeprazole (PriLOSEC) 20 mg delayed release capsule Take 20 mg by mouth 2 (two) times a day      varenicline (CHANTIX FORTINO) 0 5 MG X 11 & 1 MG X 42 tablet Take one 0 5mg tab by mouth 1x daily for 3 days, then increase to one 0 5mg tab 2x daily for 3 days, then increase to one 1mg tab 2x daily (Patient not taking: Reported on 3/3/2021) 53 tablet 0    varenicline (CHANTIX) 1 mg tablet Take 1 tablet (1 mg total) by mouth 2 (two) times a day (Patient not taking: Reported on 3/3/2021) 60 tablet 0     No current facility-administered medications for this visit  No Known Allergies    REVIEW OF SYSTEMS:    CONSTITUTIONAL: Denies any fever, chills, rigors, and weight loss  HEENT: No earache or tinnitus  Denies hearing loss or visual disturbances  CARDIOVASCULAR: No chest pain or palpitations  RESPIRATORY: Denies any cough, hemoptysis, shortness of breath or dyspnea on exertion  GASTROINTESTINAL: As noted in the History of Present Illness  GENITOURINARY: No problems with urination  Denies any hematuria or dysuria  NEUROLOGIC: No dizziness or vertigo, denies headaches  MUSCULOSKELETAL: Denies any muscle or joint pain  SKIN: Denies skin rashes or itching  ENDOCRINE: Denies excessive thirst  Denies intolerance to heat or cold  PSYCHOSOCIAL: Denies depression or anxiety  Denies any recent memory loss  VIDEO EXAMINATION:  Appearance and vitals taken from home devices    General Appearance:   Alert, cooperative, no distress   HEENT:  Normocephalic, atraumatic, anicteric  Neck supple, symmetrical, trachea midline     Lungs:   Equal chest rise and unlabored breathing, normal effort, no coughing  Cardiovascular:   No visualized JVD  Abdomen:   No abdominal distension  Skin:   No jaundice, rashes, or lesions  Musculoskeletal:   Normal range of motion visualized  Psych:  Normal affect and normal insight  Neuro:  Alert and appropriate  There were no vitals filed for this visit  I spent 20 minutes directly with the patient during this visit      VIRTUAL VISIT DISCLAIMER    Stewart Wing acknowledges that he has consented to an online visit or consultation  He understands that the online visit is based solely on information provided by him, and that, in the absence of a face-to-face physical evaluation by the physician, the diagnosis he receives is both limited and provisional in terms of accuracy and completeness  This is not intended to replace a full medical face-to-face evaluation by the physician  Stewart Wing understands and accepts these terms

## 2021-03-16 ENCOUNTER — HOSPITAL ENCOUNTER (OUTPATIENT)
Dept: GASTROENTEROLOGY | Facility: HOSPITAL | Age: 26
Setting detail: OUTPATIENT SURGERY
Discharge: HOME | End: 2021-03-16
Attending: INTERNAL MEDICINE | Admitting: INTERNAL MEDICINE
Payer: COMMERCIAL

## 2021-03-16 ENCOUNTER — ANESTHESIA EVENT (OUTPATIENT)
Dept: GASTROENTEROLOGY | Facility: HOSPITAL | Age: 26
End: 2021-03-16

## 2021-03-16 ENCOUNTER — ANESTHESIA (OUTPATIENT)
Dept: GASTROENTEROLOGY | Facility: HOSPITAL | Age: 26
End: 2021-03-16

## 2021-03-16 VITALS
DIASTOLIC BLOOD PRESSURE: 73 MMHG | WEIGHT: 175 LBS | SYSTOLIC BLOOD PRESSURE: 120 MMHG | HEIGHT: 73 IN | OXYGEN SATURATION: 100 % | HEART RATE: 54 BPM | BODY MASS INDEX: 23.19 KG/M2 | TEMPERATURE: 96.5 F | RESPIRATION RATE: 20 BRPM

## 2021-03-16 DIAGNOSIS — R93.3 ABNORMAL CT SCAN, COLON: ICD-10-CM

## 2021-03-16 DIAGNOSIS — R19.7 DIARRHEA, UNSPECIFIED TYPE: ICD-10-CM

## 2021-03-16 DIAGNOSIS — R10.84 GENERALIZED ABDOMINAL PAIN: ICD-10-CM

## 2021-03-16 PROCEDURE — 88305 TISSUE EXAM BY PATHOLOGIST: CPT | Performed by: PATHOLOGY

## 2021-03-16 PROCEDURE — 45380 COLONOSCOPY AND BIOPSY: CPT | Performed by: INTERNAL MEDICINE

## 2021-03-16 RX ORDER — PROPOFOL 10 MG/ML
INJECTION, EMULSION INTRAVENOUS CONTINUOUS PRN
Status: DISCONTINUED | OUTPATIENT
Start: 2021-03-16 | End: 2021-03-16

## 2021-03-16 RX ORDER — SODIUM CHLORIDE 9 MG/ML
INJECTION, SOLUTION INTRAVENOUS CONTINUOUS PRN
Status: DISCONTINUED | OUTPATIENT
Start: 2021-03-16 | End: 2021-03-16

## 2021-03-16 RX ORDER — PROPOFOL 10 MG/ML
INJECTION, EMULSION INTRAVENOUS AS NEEDED
Status: DISCONTINUED | OUTPATIENT
Start: 2021-03-16 | End: 2021-03-16

## 2021-03-16 RX ORDER — SODIUM CHLORIDE 9 MG/ML
125 INJECTION, SOLUTION INTRAVENOUS CONTINUOUS
Status: DISCONTINUED | OUTPATIENT
Start: 2021-03-16 | End: 2021-03-20 | Stop reason: HOSPADM

## 2021-03-16 RX ADMIN — SODIUM CHLORIDE: 0.9 INJECTION, SOLUTION INTRAVENOUS at 09:34

## 2021-03-16 RX ADMIN — PROPOFOL 50 MG: 10 INJECTION, EMULSION INTRAVENOUS at 09:41

## 2021-03-16 RX ADMIN — PROPOFOL 200 MCG/KG/MIN: 10 INJECTION, EMULSION INTRAVENOUS at 09:39

## 2021-03-16 RX ADMIN — PROPOFOL 50 MG: 10 INJECTION, EMULSION INTRAVENOUS at 09:45

## 2021-03-16 RX ADMIN — PROPOFOL 50 MG: 10 INJECTION, EMULSION INTRAVENOUS at 09:43

## 2021-03-16 RX ADMIN — SODIUM CHLORIDE 125 ML/HR: 0.9 INJECTION, SOLUTION INTRAVENOUS at 08:22

## 2021-03-16 RX ADMIN — PROPOFOL 50 MG: 10 INJECTION, EMULSION INTRAVENOUS at 10:04

## 2021-03-16 RX ADMIN — PROPOFOL 50 MG: 10 INJECTION, EMULSION INTRAVENOUS at 09:39

## 2021-03-16 NOTE — INTERVAL H&P NOTE
H&P reviewed  After examining the patient I find no changes in the patients condition since the H&P had been written      Vitals:    03/16/21 0818   BP: 128/83   Pulse: 64   Resp: 20   Temp: 97 8 °F (36 6 °C)   SpO2: 97%

## 2021-03-16 NOTE — ANESTHESIA POSTPROCEDURE EVALUATION
Post-Op Assessment Note    CV Status:  Stable  Pain Score: 1    Pain management: adequate     Mental Status:  Alert and awake   Hydration Status:  Euvolemic   PONV Controlled:  Controlled   Airway Patency:  Patent      Post Op Vitals Reviewed: Yes      Staff: Anesthesiologist, CRNA         No complications documented      /73 (03/16/21 1039)    Temp     Pulse (!) 54 (03/16/21 1039)   Resp 20 (03/16/21 1039)    SpO2

## 2021-03-16 NOTE — ANESTHESIA PREPROCEDURE EVALUATION
Procedure:  COLONOSCOPY    Relevant Problems   ANESTHESIA (within normal limits)  old chart reviewed      CARDIO (within normal limits)      ENDO (within normal limits)      GI/HEPATIC  CC only no acute changes  bowel prep      /RENAL (within normal limits)      HEMATOLOGY (within normal limits)      MUSCULOSKELETAL (within normal limits)      NEURO/PSYCH   (+) Depression with anxiety      PULMONARY (within normal limits)        Physical Exam    Airway    Mallampati score: II  TM Distance: >3 FB  Neck ROM: limited     Dental       Cardiovascular  Cardiovascular exam normal    Pulmonary  Pulmonary exam normal     Other Findings  Fixed upper and lower teeth and in good repair      Anesthesia Plan  ASA Score- 2     Anesthesia Type- IV sedation with anesthesia with ASA Monitors  Additional Monitors:   Airway Plan:           Plan Factors-Exercise tolerance (METS): >4 METS  Chart reviewed  Existing labs reviewed  Patient summary reviewed  Patient is not a current smoker  Patient not instructed to abstain from smoking on day of procedure  Patient did not smoke on day of surgery  Obstructive sleep apnea risk education given perioperatively  Induction- intravenous  Postoperative Plan-     Informed Consent- Anesthetic plan and risks discussed with patient and spouse  I personally reviewed this patient with the CRNA  Discussed and agreed on the Anesthesia Plan with the CRNA  Benita Gan

## 2021-03-22 ENCOUNTER — TELEPHONE (OUTPATIENT)
Dept: GASTROENTEROLOGY | Facility: AMBULARY SURGERY CENTER | Age: 26
End: 2021-03-22

## 2021-03-22 NOTE — TELEPHONE ENCOUNTER
Patients GI provider:  Dr Buck Del Real      Number to return call: [phone]     Reason for call: Pt calling stated he is returning a vm from [facility] for results       Scheduled procedure/appointment date if applicable: Apt/procedure n/a